# Patient Record
Sex: FEMALE | Race: WHITE | NOT HISPANIC OR LATINO | Employment: OTHER
[De-identification: names, ages, dates, MRNs, and addresses within clinical notes are randomized per-mention and may not be internally consistent; named-entity substitution may affect disease eponyms.]

---

## 2019-04-23 ENCOUNTER — HOSPITAL (OUTPATIENT)
Dept: OTHER | Age: 74
End: 2019-04-23
Attending: INTERNAL MEDICINE

## 2019-04-25 ENCOUNTER — HOSPITAL (OUTPATIENT)
Dept: OTHER | Age: 74
End: 2019-04-25
Attending: INTERNAL MEDICINE

## 2019-05-07 ENCOUNTER — HOSPITAL (OUTPATIENT)
Dept: OTHER | Age: 74
End: 2019-05-07

## 2019-05-07 ENCOUNTER — HOSPITAL (OUTPATIENT)
Dept: OTHER | Age: 74
End: 2019-05-07
Attending: INTERNAL MEDICINE

## 2019-05-20 ENCOUNTER — HOSPITAL (OUTPATIENT)
Dept: OTHER | Age: 74
End: 2019-05-20
Attending: INTERNAL MEDICINE

## 2020-05-18 DIAGNOSIS — R91.8 OTHER NONSPECIFIC ABNORMAL FINDING OF LUNG FIELD: Primary | ICD-10-CM

## 2020-05-22 ENCOUNTER — HOSPITAL ENCOUNTER (OUTPATIENT)
Dept: CT IMAGING | Age: 75
Discharge: HOME | End: 2020-05-22
Attending: INTERNAL MEDICINE

## 2020-05-22 DIAGNOSIS — R91.8 OTHER NONSPECIFIC ABNORMAL FINDING OF LUNG FIELD: ICD-10-CM

## 2020-05-22 PROCEDURE — 71250 CT THORAX DX C-: CPT

## 2020-05-29 PROBLEM — I34.1 MVP (MITRAL VALVE PROLAPSE): Status: ACTIVE | Noted: 2020-01-07

## 2020-05-29 PROBLEM — Q21.1 PFO WITH ATRIAL SEPTAL ANEURYSM: Status: ACTIVE | Noted: 2020-01-07

## 2020-05-29 PROBLEM — I25.3 PFO WITH ATRIAL SEPTAL ANEURYSM (HCC): Status: ACTIVE | Noted: 2020-01-07

## 2020-05-29 PROBLEM — Q21.12 PFO WITH ATRIAL SEPTAL ANEURYSM: Status: ACTIVE | Noted: 2020-01-07

## 2020-05-29 PROBLEM — I25.3 PFO WITH ATRIAL SEPTAL ANEURYSM: Status: ACTIVE | Noted: 2020-01-07

## 2020-05-29 PROBLEM — Q21.12 PFO WITH ATRIAL SEPTAL ANEURYSM (HCC): Status: ACTIVE | Noted: 2020-01-07

## 2020-05-29 PROBLEM — I34.0 NONRHEUMATIC MITRAL VALVE REGURGITATION: Status: ACTIVE | Noted: 2020-01-07

## 2020-05-29 PROBLEM — I49.3 SYMPTOMATIC PVCS: Status: ACTIVE | Noted: 2018-12-07

## 2020-08-06 RX ORDER — BIOTIN 10000 MCG
CAPSULE ORAL DAILY
COMMUNITY

## 2020-08-06 RX ORDER — ATORVASTATIN CALCIUM 10 MG/1
10 TABLET, FILM COATED ORAL NIGHTLY
COMMUNITY

## 2020-08-10 ENCOUNTER — LAB ENCOUNTER (OUTPATIENT)
Dept: LAB | Facility: HOSPITAL | Age: 75
End: 2020-08-10
Attending: STUDENT IN AN ORGANIZED HEALTH CARE EDUCATION/TRAINING PROGRAM
Payer: MEDICARE

## 2020-08-10 DIAGNOSIS — K59.4 RECTAL SPASM: ICD-10-CM

## 2020-08-11 LAB — SARS-COV-2 RNA RESP QL NAA+PROBE: NOT DETECTED

## 2020-08-12 ENCOUNTER — ANESTHESIA EVENT (OUTPATIENT)
Dept: ENDOSCOPY | Facility: HOSPITAL | Age: 75
End: 2020-08-12
Payer: MEDICARE

## 2020-08-12 ENCOUNTER — HOSPITAL ENCOUNTER (OUTPATIENT)
Facility: HOSPITAL | Age: 75
Setting detail: HOSPITAL OUTPATIENT SURGERY
Discharge: HOME | End: 2020-08-12
Attending: STUDENT IN AN ORGANIZED HEALTH CARE EDUCATION/TRAINING PROGRAM | Admitting: STUDENT IN AN ORGANIZED HEALTH CARE EDUCATION/TRAINING PROGRAM
Payer: MEDICARE

## 2020-08-12 ENCOUNTER — ANESTHESIA (OUTPATIENT)
Dept: ENDOSCOPY | Facility: HOSPITAL | Age: 75
End: 2020-08-12
Payer: MEDICARE

## 2020-08-12 VITALS
SYSTOLIC BLOOD PRESSURE: 136 MMHG | DIASTOLIC BLOOD PRESSURE: 63 MMHG | BODY MASS INDEX: 33.38 KG/M2 | HEART RATE: 60 BPM | TEMPERATURE: 97 F | RESPIRATION RATE: 16 BRPM | HEIGHT: 60 IN | WEIGHT: 170 LBS | OXYGEN SATURATION: 98 %

## 2020-08-12 DIAGNOSIS — K59.4 RECTAL SPASM: Primary | ICD-10-CM

## 2020-08-12 DIAGNOSIS — Z86.010 PERSONAL HISTORY OF COLONIC POLYPS: ICD-10-CM

## 2020-08-12 DIAGNOSIS — Z80.0 FAMILY HISTORY OF COLON CANCER IN MOTHER: ICD-10-CM

## 2020-08-12 PROCEDURE — 88305 TISSUE EXAM BY PATHOLOGIST: CPT | Performed by: STUDENT IN AN ORGANIZED HEALTH CARE EDUCATION/TRAINING PROGRAM

## 2020-08-12 PROCEDURE — 0DBK8ZX EXCISION OF ASCENDING COLON, VIA NATURAL OR ARTIFICIAL OPENING ENDOSCOPIC, DIAGNOSTIC: ICD-10-PCS | Performed by: STUDENT IN AN ORGANIZED HEALTH CARE EDUCATION/TRAINING PROGRAM

## 2020-08-12 RX ORDER — OMEGA-3S/DHA/EPA/FISH OIL 1000-1400
CAPSULE,DELAYED RELEASE (ENTERIC COATED) ORAL
COMMUNITY
Start: 2020-07-10

## 2020-08-12 RX ORDER — ONDANSETRON 2 MG/ML
4 INJECTION INTRAMUSCULAR; INTRAVENOUS AS NEEDED
Status: DISCONTINUED | OUTPATIENT
Start: 2020-08-12 | End: 2020-08-12

## 2020-08-12 RX ORDER — LIDOCAINE HYDROCHLORIDE 10 MG/ML
INJECTION, SOLUTION EPIDURAL; INFILTRATION; INTRACAUDAL; PERINEURAL AS NEEDED
Status: DISCONTINUED | OUTPATIENT
Start: 2020-08-12 | End: 2020-08-12 | Stop reason: SURG

## 2020-08-12 RX ORDER — SODIUM CHLORIDE, SODIUM LACTATE, POTASSIUM CHLORIDE, CALCIUM CHLORIDE 600; 310; 30; 20 MG/100ML; MG/100ML; MG/100ML; MG/100ML
INJECTION, SOLUTION INTRAVENOUS CONTINUOUS
Status: DISCONTINUED | OUTPATIENT
Start: 2020-08-12 | End: 2020-08-12

## 2020-08-12 RX ORDER — NALOXONE HYDROCHLORIDE 0.4 MG/ML
80 INJECTION, SOLUTION INTRAMUSCULAR; INTRAVENOUS; SUBCUTANEOUS AS NEEDED
Status: DISCONTINUED | OUTPATIENT
Start: 2020-08-12 | End: 2020-08-12

## 2020-08-12 RX ADMIN — SODIUM CHLORIDE, SODIUM LACTATE, POTASSIUM CHLORIDE, CALCIUM CHLORIDE: 600; 310; 30; 20 INJECTION, SOLUTION INTRAVENOUS at 11:48:00

## 2020-08-12 RX ADMIN — LIDOCAINE HYDROCHLORIDE 30 MG: 10 INJECTION, SOLUTION EPIDURAL; INFILTRATION; INTRACAUDAL; PERINEURAL at 11:27:00

## 2020-08-12 RX ADMIN — SODIUM CHLORIDE, SODIUM LACTATE, POTASSIUM CHLORIDE, CALCIUM CHLORIDE: 600; 310; 30; 20 INJECTION, SOLUTION INTRAVENOUS at 11:22:00

## 2020-08-12 NOTE — ANESTHESIA PREPROCEDURE EVALUATION
PRE-OP EVALUATION    Patient Name: Giovanni Gitelman    Pre-op Diagnosis: Rectal spasm [K59.4]  Personal history of colonic polyps [Z86.010]  Family history of colon cancer in mother [Z80.0]    Procedure(s):  COLONOSCOPY    Surgeon(s) and Role:     * Moises, colon     Encounter for routine gynecological examination     MVP (mitral valve prolapse)     Nonrheumatic mitral valve regurgitation     Osteopenia     Other and unspecified hyperlipidemia     Palpitations     PFO with atrial septal aneurysm     Stenosis

## 2020-08-12 NOTE — H&P
Gastroenterology H/P  I have personally seen and examined the patient. Patient Name: Wilma Whitman  CC: history of colon polyps  HPI: Seen by NP in our office 5/2020. Has history of colon adenoma in 2015, due for follow-up.   She has chronic intermitt skin disorders            Rheumatologic: The patient reports no history of chronic arthritis, myalgias, arthralgias            Genitourinary:  The patient reports no history of recurrent urinary tract infections, hematuria, dysuria, or nephrolithiasis was obtained from the patient for the above procedure. She was told indications, nature and outcome, alternatives, risks, and complications. She fully understood and agreed to the procedure.       Carroll De Leon MD

## 2020-08-12 NOTE — OPERATIVE REPORT
Colon operative report  Patient Name: El Gardner  Procedure: Colonoscopy with forceps polypectomy  Indication: history of adenomatous colon polyps  Attending: Anny Aviles M.D.   Consent: The risks, benefits, and alternatives were discussed with the colon.  4. Moderate internal hemorrhoids. 5. Otherwise normal exam.  Impression: Findings as above. Recommendations:   1. Await pathology. 2. High fiber diet.   3. Could consider a repeat in 5 years if has symptoms or other concerns, but given the mini

## 2020-08-12 NOTE — ANESTHESIA POSTPROCEDURE EVALUATION
[facility] Patient Status:  Hospital Outpatient Surgery   Age/Gender 76year old female MRN [id number]   Location [address] [facility]. Attending Mettu, Becky Thorpe MD   Hosp Day # 0 PCP Baby Than       Anesthesia Post-op Note

## 2020-08-15 NOTE — PROGRESS NOTES
Addended by: ZOHREH CONRAD on: 3/7/2019 01:14 PM     Modules accepted: Orders     Biopsies are negative for any chronic inflammation. There \"polyp-like\" tissue removed is not a true Polyp. There is no immediate reason for a repeat colonoscopy, and in fact, even less reason to put you through another exam at age [de-identified].   For now, call my

## 2022-01-17 PROBLEM — M25.511 ACUTE PAIN OF RIGHT SHOULDER: Status: ACTIVE | Noted: 2022-01-17

## 2022-01-17 PROBLEM — M75.41 IMPINGEMENT SYNDROME OF RIGHT SHOULDER: Status: ACTIVE | Noted: 2022-01-17

## 2022-01-31 ENCOUNTER — LAB ENCOUNTER (OUTPATIENT)
Dept: LAB | Age: 77
End: 2022-01-31
Attending: STUDENT IN AN ORGANIZED HEALTH CARE EDUCATION/TRAINING PROGRAM
Payer: MEDICARE

## 2022-01-31 DIAGNOSIS — R12 CHRONIC HEARTBURN: ICD-10-CM

## 2022-02-01 LAB — SARS-COV-2 RNA RESP QL NAA+PROBE: NOT DETECTED

## 2022-02-03 ENCOUNTER — HOSPITAL ENCOUNTER (OUTPATIENT)
Facility: HOSPITAL | Age: 77
Setting detail: HOSPITAL OUTPATIENT SURGERY
Discharge: HOME | End: 2022-02-03
Attending: STUDENT IN AN ORGANIZED HEALTH CARE EDUCATION/TRAINING PROGRAM | Admitting: STUDENT IN AN ORGANIZED HEALTH CARE EDUCATION/TRAINING PROGRAM
Payer: MEDICARE

## 2022-02-03 ENCOUNTER — ANESTHESIA (OUTPATIENT)
Dept: ENDOSCOPY | Facility: HOSPITAL | Age: 77
End: 2022-02-03
Payer: MEDICARE

## 2022-02-03 ENCOUNTER — ANESTHESIA EVENT (OUTPATIENT)
Dept: ENDOSCOPY | Facility: HOSPITAL | Age: 77
End: 2022-02-03
Payer: MEDICARE

## 2022-02-03 VITALS
TEMPERATURE: 97 F | RESPIRATION RATE: 16 BRPM | BODY MASS INDEX: 34.95 KG/M2 | SYSTOLIC BLOOD PRESSURE: 132 MMHG | DIASTOLIC BLOOD PRESSURE: 56 MMHG | HEART RATE: 63 BPM | WEIGHT: 178 LBS | HEIGHT: 60 IN | OXYGEN SATURATION: 96 %

## 2022-02-03 DIAGNOSIS — R12 CHRONIC HEARTBURN: Primary | ICD-10-CM

## 2022-02-03 PROCEDURE — 0DB48ZX EXCISION OF ESOPHAGOGASTRIC JUNCTION, VIA NATURAL OR ARTIFICIAL OPENING ENDOSCOPIC, DIAGNOSTIC: ICD-10-PCS | Performed by: STUDENT IN AN ORGANIZED HEALTH CARE EDUCATION/TRAINING PROGRAM

## 2022-02-03 PROCEDURE — 0DB68ZX EXCISION OF STOMACH, VIA NATURAL OR ARTIFICIAL OPENING ENDOSCOPIC, DIAGNOSTIC: ICD-10-PCS | Performed by: STUDENT IN AN ORGANIZED HEALTH CARE EDUCATION/TRAINING PROGRAM

## 2022-02-03 PROCEDURE — 88305 TISSUE EXAM BY PATHOLOGIST: CPT | Performed by: STUDENT IN AN ORGANIZED HEALTH CARE EDUCATION/TRAINING PROGRAM

## 2022-02-03 RX ORDER — SODIUM CHLORIDE, SODIUM LACTATE, POTASSIUM CHLORIDE, CALCIUM CHLORIDE 600; 310; 30; 20 MG/100ML; MG/100ML; MG/100ML; MG/100ML
INJECTION, SOLUTION INTRAVENOUS CONTINUOUS
OUTPATIENT
Start: 2022-02-03

## 2022-02-03 RX ORDER — LIDOCAINE HYDROCHLORIDE 10 MG/ML
INJECTION, SOLUTION EPIDURAL; INFILTRATION; INTRACAUDAL; PERINEURAL AS NEEDED
Status: DISCONTINUED | OUTPATIENT
Start: 2022-02-03 | End: 2022-02-03 | Stop reason: SURG

## 2022-02-03 RX ORDER — NALOXONE HYDROCHLORIDE 0.4 MG/ML
80 INJECTION, SOLUTION INTRAMUSCULAR; INTRAVENOUS; SUBCUTANEOUS AS NEEDED
OUTPATIENT
Start: 2022-02-03 | End: 2022-02-03

## 2022-02-03 RX ORDER — SODIUM CHLORIDE, SODIUM LACTATE, POTASSIUM CHLORIDE, CALCIUM CHLORIDE 600; 310; 30; 20 MG/100ML; MG/100ML; MG/100ML; MG/100ML
INJECTION, SOLUTION INTRAVENOUS CONTINUOUS
Status: DISCONTINUED | OUTPATIENT
Start: 2022-02-03 | End: 2022-02-03

## 2022-02-03 RX ADMIN — LIDOCAINE HYDROCHLORIDE 100 MG: 10 INJECTION, SOLUTION EPIDURAL; INFILTRATION; INTRACAUDAL; PERINEURAL at 07:51:00

## 2022-02-03 NOTE — ANESTHESIA POSTPROCEDURE EVALUATION
[address] Patient Status:  Hospital Outpatient Surgery   Age/Gender 68year old female MRN [id number]   Location [address] [address] Attending tu, Frances Horner MD   Hosp Day # 0 PCP Baby Than       Anesthesia Post-op Note  ESOPHAGOGASTRODUODENOSCOPY (EGD) with biopsies    Procedure Summary     Date: 02/03/22 Room / Location: [address] [place] [address] ENDOSCOPY 02 / [address] [facility] ENDOSCOPY    Anesthesia Start: 5199 Anesthesia Stop: 9408    Procedure: ESOPHAGOGASTRODUODENOSCOPY (EGD) with biopsies (N/A ) Diagnosis:       Chronic heartburn      (gastritis, gastric polyps, reflux esophagitis)    Surgeons: Franca Madrigal MD Anesthesiologist: Ziggy Mack MD    Anesthesia Type: MAC ASA Status: 3          Anesthesia Type: MAC    Vitals Value Taken Time   /56 02/03/22 0821   Temp  02/03/22 0846   Pulse 66 02/03/22 0825   Resp 16 02/03/22 0820   SpO2 95 % 02/03/22 0825   Vitals shown include unvalidated device data. Patient Location: Endoscopy    Anesthesia Type: MAC    Airway Patency: patent    Postop Pain Control: adequate    Mental Status: mildly sedated but able to meaningfully participate in the post-anesthesia evaluation    Nausea/Vomiting: none    Cardiopulmonary/Hydration status: stable euvolemic    Complications: no apparent anesthesia related complications    Postop vital signs: stable    Dental Exam: Unchanged from Preop    Patient to be discharged home when criteria met.

## 2022-02-11 NOTE — PROGRESS NOTES
Debra Horn,    Biopsies show chronic reflux, as expected. No other notable findings. 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   Please call with any questions,    Mariah Pelletier MD

## 2022-12-14 ENCOUNTER — OFFICE VISIT (OUTPATIENT)
Facility: LOCATION | Age: 77
End: 2022-12-14
Payer: MEDICARE

## 2022-12-14 DIAGNOSIS — J38.6 SUBGLOTTIC STENOSIS: Primary | ICD-10-CM

## 2022-12-14 DIAGNOSIS — H61.23 BILATERAL IMPACTED CERUMEN: ICD-10-CM

## 2022-12-14 PROCEDURE — 92504 EAR MICROSCOPY EXAMINATION: CPT | Performed by: OTOLARYNGOLOGY

## 2022-12-14 PROCEDURE — 99214 OFFICE O/P EST MOD 30 MIN: CPT | Performed by: OTOLARYNGOLOGY

## 2022-12-14 PROCEDURE — 31575 DIAGNOSTIC LARYNGOSCOPY: CPT | Performed by: OTOLARYNGOLOGY

## 2022-12-14 RX ORDER — CEFUROXIME AXETIL 250 MG/1
250 TABLET ORAL 2 TIMES DAILY
Qty: 20 TABLET | Refills: 0 | Status: SHIPPED | OUTPATIENT
Start: 2022-12-14

## 2022-12-14 RX ORDER — METHYLPREDNISOLONE 4 MG/1
TABLET ORAL
Qty: 21 TABLET | Refills: 0 | Status: SHIPPED | OUTPATIENT
Start: 2022-12-14

## 2023-04-23 ENCOUNTER — WALK IN (OUTPATIENT)
Dept: URGENT CARE | Age: 78
End: 2023-04-23
Attending: EMERGENCY MEDICINE

## 2023-04-23 VITALS
HEART RATE: 74 BPM | TEMPERATURE: 98 F | BODY MASS INDEX: 28.16 KG/M2 | OXYGEN SATURATION: 96 % | DIASTOLIC BLOOD PRESSURE: 83 MMHG | SYSTOLIC BLOOD PRESSURE: 132 MMHG | RESPIRATION RATE: 16 BRPM | WEIGHT: 153 LBS | HEIGHT: 62 IN

## 2023-04-23 DIAGNOSIS — H10.33 ACUTE BACTERIAL CONJUNCTIVITIS OF BOTH EYES: Primary | ICD-10-CM

## 2023-04-23 PROCEDURE — 99202 OFFICE O/P NEW SF 15 MIN: CPT

## 2023-04-23 RX ORDER — POLYMYXIN B SULFATE AND TRIMETHOPRIM 1; 10000 MG/ML; [USP'U]/ML
SOLUTION OPHTHALMIC
Qty: 10 ML | Refills: 0 | Status: SHIPPED | OUTPATIENT
Start: 2023-04-23

## 2023-04-23 ASSESSMENT — ENCOUNTER SYMPTOMS
EYE REDNESS: 1
EYE DISCHARGE: 1
PSYCHIATRIC NEGATIVE: 1
FEVER: 0
RESPIRATORY NEGATIVE: 1
NEUROLOGICAL NEGATIVE: 1
EYE ITCHING: 1
FOREIGN BODY SENSATION: 0
GASTROINTESTINAL NEGATIVE: 1
ENDOCRINE NEGATIVE: 1
CONSTITUTIONAL NEGATIVE: 1
ALLERGIC/IMMUNOLOGIC NEGATIVE: 1
HEMATOLOGIC/LYMPHATIC NEGATIVE: 1

## 2023-04-23 ASSESSMENT — PAIN SCALES - GENERAL
PAINLEVEL: 0
PAINLEVEL: 0

## 2023-04-23 ASSESSMENT — VISUAL ACUITY
OD_CC: 20/20
OS_CC: 20/20

## 2023-12-04 PROBLEM — I36.1 NON-RHEUMATIC TRICUSPID VALVE INSUFFICIENCY: Status: ACTIVE | Noted: 2020-01-07

## 2023-12-04 PROBLEM — I25.3 ATRIAL SEPTAL ANEURYSM: Status: ACTIVE | Noted: 2020-07-07

## 2023-12-08 ENCOUNTER — LAB ENCOUNTER (OUTPATIENT)
Dept: LAB | Age: 78
End: 2023-12-08
Payer: MEDICARE

## 2023-12-08 DIAGNOSIS — R74.01 ELEVATED ALT MEASUREMENT: ICD-10-CM

## 2023-12-08 DIAGNOSIS — R19.5 LOOSE STOOLS: ICD-10-CM

## 2023-12-08 LAB
CRYPTOSP AG STL QL IA: NEGATIVE
G LAMBLIA AG STL QL IA: NEGATIVE

## 2023-12-08 PROCEDURE — 82705 FATS/LIPIDS FECES QUAL: CPT

## 2023-12-08 PROCEDURE — 87272 CRYPTOSPORIDIUM AG IF: CPT

## 2023-12-08 PROCEDURE — 87329 GIARDIA AG IA: CPT

## 2023-12-08 PROCEDURE — 82656 EL-1 FECAL QUAL/SEMIQ: CPT

## 2023-12-08 PROCEDURE — 87493 C DIFF AMPLIFIED PROBE: CPT

## 2023-12-08 PROCEDURE — 89055 LEUKOCYTE ASSESSMENT FECAL: CPT

## 2023-12-08 PROCEDURE — 83993 ASSAY FOR CALPROTECTIN FECAL: CPT

## 2023-12-09 LAB — C DIFF TOX B STL QL: NEGATIVE

## 2023-12-11 LAB — CALPROTECTIN STL-MCNT: 34.3 ΜG/G (ref ?–50)

## 2023-12-13 LAB — PANCREATIC ELAST FECAL: 370 UG ELAST./G

## 2023-12-14 LAB
FATS NEUTRAL: NORMAL
FATS TOTAL: NORMAL

## 2024-01-10 ENCOUNTER — HOSPITAL ENCOUNTER (OUTPATIENT)
Dept: ULTRASOUND IMAGING | Age: 79
Discharge: HOME | End: 2024-01-10
Payer: MEDICARE

## 2024-01-10 DIAGNOSIS — R74.01 ELEVATED ALT MEASUREMENT: ICD-10-CM

## 2024-01-10 PROCEDURE — 76700 US EXAM ABDOM COMPLETE: CPT

## 2024-01-10 NOTE — PROGRESS NOTES
Results reviewed and discussed with the patient over the phone. US of the abdomen showed Echogenic mass in the liver. Recommend MRI liver with Eovist contrast. Pt verbalizes understanding and agrees with the plan.

## 2024-01-14 ENCOUNTER — HOSPITAL ENCOUNTER (OUTPATIENT)
Dept: MRI IMAGING | Facility: HOSPITAL | Age: 79
Discharge: HOME | End: 2024-01-14
Payer: MEDICARE

## 2024-01-14 ENCOUNTER — HOSPITAL ENCOUNTER (OUTPATIENT)
Dept: MRI IMAGING | Facility: HOSPITAL | Age: 79
End: 2024-01-14
Payer: MEDICARE

## 2024-01-14 DIAGNOSIS — K76.9 LIVER LESION: ICD-10-CM

## 2024-01-14 PROCEDURE — A9575 INJ GADOTERATE MEGLUMI 0.1ML: HCPCS

## 2024-01-14 PROCEDURE — 74183 MRI ABD W/O CNTR FLWD CNTR: CPT

## 2024-01-14 RX ORDER — GADOTERATE MEGLUMINE 376.9 MG/ML
20 INJECTION INTRAVENOUS
Status: COMPLETED | OUTPATIENT
Start: 2024-01-14 | End: 2024-01-14

## 2024-01-14 RX ADMIN — GADOTERATE MEGLUMINE 16 ML: 376.9 INJECTION INTRAVENOUS at 10:23:00

## 2024-01-16 NOTE — PROGRESS NOTES
Results reviewed and discussed with the patient over the phone. MRI liver showed fatty liver disease. Recommend gradual weight loss and proceed with AFP tumor marker lab. She verbalizes understanding and agrees with the plan.

## 2024-01-25 ENCOUNTER — LAB ENCOUNTER (OUTPATIENT)
Dept: LAB | Age: 79
End: 2024-01-25
Payer: MEDICARE

## 2024-01-25 DIAGNOSIS — K76.9 LIVER LESION: ICD-10-CM

## 2024-01-25 DIAGNOSIS — K76.0 NAFLD (NONALCOHOLIC FATTY LIVER DISEASE): ICD-10-CM

## 2024-01-25 DIAGNOSIS — K74.01 HEPATIC FIBROSIS, EARLY FIBROSIS: ICD-10-CM

## 2024-01-25 LAB — AFP-TM SERPL-MCNC: 8.6 NG/ML

## 2024-01-25 PROCEDURE — 36415 COLL VENOUS BLD VENIPUNCTURE: CPT

## 2024-01-25 PROCEDURE — 82105 ALPHA-FETOPROTEIN SERUM: CPT

## 2024-02-28 ENCOUNTER — LAB ENCOUNTER (OUTPATIENT)
Dept: LAB | Age: 79
End: 2024-02-28
Payer: MEDICARE

## 2024-02-28 DIAGNOSIS — R97.8 OTHER ABNORMAL TUMOR MARKERS: ICD-10-CM

## 2024-02-28 DIAGNOSIS — R74.01 ELEVATED ALT MEASUREMENT: ICD-10-CM

## 2024-02-28 DIAGNOSIS — K76.0 FATTY LIVER: ICD-10-CM

## 2024-02-28 LAB
AFP-TM SERPL-MCNC: 11.1 NG/ML
ALBUMIN SERPL-MCNC: 4.4 G/DL (ref 3.2–4.8)
ALBUMIN/GLOB SERPL: 1.4 {RATIO} (ref 1–2)
ALP LIVER SERPL-CCNC: 60 U/L
ALT SERPL-CCNC: 23 U/L
ANION GAP SERPL CALC-SCNC: 5 MMOL/L (ref 0–18)
AST SERPL-CCNC: 30 U/L (ref ?–34)
BASOPHILS # BLD AUTO: 0.01 X10(3) UL (ref 0–0.2)
BASOPHILS NFR BLD AUTO: 0.4 %
BILIRUB SERPL-MCNC: 0.6 MG/DL (ref 0.2–1.1)
BUN BLD-MCNC: 17 MG/DL (ref 9–23)
BUN/CREAT SERPL: 21.5 (ref 10–20)
CALCIUM BLD-MCNC: 9.9 MG/DL (ref 8.7–10.4)
CHLORIDE SERPL-SCNC: 107 MMOL/L (ref 98–112)
CO2 SERPL-SCNC: 27 MMOL/L (ref 21–32)
CREAT BLD-MCNC: 0.79 MG/DL
DEPRECATED RDW RBC AUTO: 51.3 FL (ref 35.1–46.3)
EGFRCR SERPLBLD CKD-EPI 2021: 77 ML/MIN/1.73M2 (ref 60–?)
EOSINOPHIL # BLD AUTO: 0.01 X10(3) UL (ref 0–0.7)
EOSINOPHIL NFR BLD AUTO: 0.4 %
ERYTHROCYTE [DISTWIDTH] IN BLOOD BY AUTOMATED COUNT: 14.2 % (ref 11–15)
FASTING STATUS PATIENT QL REPORTED: YES
GLOBULIN PLAS-MCNC: 3.1 G/DL (ref 2.8–4.4)
GLUCOSE BLD-MCNC: 97 MG/DL (ref 70–99)
HCT VFR BLD AUTO: 39.6 %
HGB BLD-MCNC: 13.2 G/DL
IMM GRANULOCYTES # BLD AUTO: 0.01 X10(3) UL (ref 0–1)
IMM GRANULOCYTES NFR BLD: 0.4 %
LYMPHOCYTES # BLD AUTO: 1.14 X10(3) UL (ref 1–4)
LYMPHOCYTES NFR BLD AUTO: 44.4 %
MCH RBC QN AUTO: 32.5 PG (ref 26–34)
MCHC RBC AUTO-ENTMCNC: 33.3 G/DL (ref 31–37)
MCV RBC AUTO: 97.5 FL
MONOCYTES # BLD AUTO: 0.36 X10(3) UL (ref 0.1–1)
MONOCYTES NFR BLD AUTO: 14 %
NEUTROPHILS # BLD AUTO: 1.04 X10 (3) UL (ref 1.5–7.7)
NEUTROPHILS # BLD AUTO: 1.04 X10(3) UL (ref 1.5–7.7)
NEUTROPHILS NFR BLD AUTO: 40.4 %
OSMOLALITY SERPL CALC.SUM OF ELEC: 289 MOSM/KG (ref 275–295)
PLATELET # BLD AUTO: 134 10(3)UL (ref 150–450)
POTASSIUM SERPL-SCNC: 4.6 MMOL/L (ref 3.5–5.1)
PROT SERPL-MCNC: 7.5 G/DL (ref 5.7–8.2)
RBC # BLD AUTO: 4.06 X10(6)UL
SODIUM SERPL-SCNC: 139 MMOL/L (ref 136–145)
WBC # BLD AUTO: 2.6 X10(3) UL (ref 4–11)

## 2024-02-28 PROCEDURE — 85025 COMPLETE CBC W/AUTO DIFF WBC: CPT

## 2024-02-28 PROCEDURE — 80053 COMPREHEN METABOLIC PANEL: CPT

## 2024-02-28 PROCEDURE — 36415 COLL VENOUS BLD VENIPUNCTURE: CPT

## 2024-02-28 PROCEDURE — 82105 ALPHA-FETOPROTEIN SERUM: CPT

## 2024-04-09 RX ORDER — MULTIVITAMIN WITH IRON
50 TABLET ORAL DAILY
COMMUNITY

## 2024-04-09 RX ORDER — METRONIDAZOLE 500 MG/1
500 TABLET ORAL 3 TIMES DAILY
COMMUNITY

## 2024-04-09 RX ORDER — ASPIRIN 81 MG/1
81 TABLET ORAL DAILY
COMMUNITY

## 2024-04-09 RX ORDER — CIPROFLOXACIN 500 MG/1
500 TABLET, FILM COATED ORAL 2 TIMES DAILY
COMMUNITY
End: 2024-05-17 | Stop reason: ALTCHOICE

## 2024-04-16 ENCOUNTER — HOSPITAL ENCOUNTER (OUTPATIENT)
Dept: MRI IMAGING | Age: 79
Discharge: HOME | End: 2024-04-16
Payer: MEDICARE

## 2024-04-16 DIAGNOSIS — R77.2 ELEVATED AFP: ICD-10-CM

## 2024-04-16 PROCEDURE — A9575 INJ GADOTERATE MEGLUMI 0.1ML: HCPCS

## 2024-04-16 PROCEDURE — 74183 MRI ABD W/O CNTR FLWD CNTR: CPT

## 2024-04-16 RX ORDER — GADOTERATE MEGLUMINE 376.9 MG/ML
20 INJECTION INTRAVENOUS
Status: COMPLETED | OUTPATIENT
Start: 2024-04-16 | End: 2024-04-16

## 2024-04-16 RX ADMIN — GADOTERATE MEGLUMINE 20 ML: 376.9 INJECTION INTRAVENOUS at 15:58:00

## 2024-04-26 ENCOUNTER — HOSPITAL ENCOUNTER (OUTPATIENT)
Dept: ULTRASOUND IMAGING | Age: 79
Discharge: HOME | End: 2024-04-26
Payer: MEDICARE

## 2024-04-26 DIAGNOSIS — R74.01 ELEVATED ALT MEASUREMENT: ICD-10-CM

## 2024-04-26 DIAGNOSIS — K76.0 FATTY LIVER: ICD-10-CM

## 2024-04-26 PROCEDURE — 76705 ECHO EXAM OF ABDOMEN: CPT

## 2024-04-26 PROCEDURE — 76981 USE PARENCHYMA: CPT

## 2024-05-17 NOTE — H&P (VIEW-ONLY)
CHIEF COMPLAINT:   Liver Disease    HPI:    Afua Bryant is a 79 year old female with history of esophagitis, diverticulosis with diverticulitis, fatty liver with combination of NARAYANAN and CINDY, liver hemangioma.  She is here for follow-up, last seen 2/2024 by Edmar Sol NP.      Had bout of diverticulitis (suspected) and took 5 days of Cipro and Flagyl.  This was in early April 2024.  She had EGD and colonoscopy scheduled for 4/18/2024.  This has since been rescheduled to 6/13/2024.    She had been given antibiotics by her PCP prior to going on a river cruise, towards the end of her trip developed her typical diverticulitis symptoms (lower pelvic pain, L sided abd pain, low grade temps), so she started the antibiotics.    She had no rectal bleeding, did have her typical chronic diarrhea.    She remains on her psyllium and since then, the early morning stool is normal, but the latter part of the day has mushier stools that are at times uncontrollable.    She has been on Lexapro for the last 2 yrs, considering going off of this, as she doesn't feel that she needs it anymore.  She has to use her Nexium twice daily, as she gets recurrent reflux symptoms in the evening if she only takes its once daily in the morning.    She has minimized EtOH prev, but now drinking regularly again, on avg 10 large glasses of wine per week       Gastrointestinal History   Family History: fam hx of colon CA (mother 60s)     8/04/1997 - Colonoscopy Dr. Gutierrez  - diverticulosis, 5yr recall     8/08/2001 - SGI Consult Dr. Rene  + GERD  - constant hoarseness of voice  - pt feels that Prilosec is giving her side effects of rectal itching     8/17/2001 - Barium swallow/esophagus: small HH, mild reflux, decreased secondary peristalsis     9/26/2002 - EGD/Colonoscopy Dr. Rene  EGD: antral gastritis, small HH, Bx: negative for H. Pylori  Colonoscopy: colon polyp x1 <10mm; HP, diverticulosis, internal hemorrhoids, 5yr recall      10/05/2005 - EGD/Colonoscopy Dr. Rene  EGD: gastritis, small HH, Bx: negative for H. Pylori  Colonoscopy: colon polyps x3 <10mm; HP x3, diverticulosis, internal hemorrhoids     11/24/2008 - Colonoscopy Dr. Rene  - colon polyp x1 <10mm; HP, diverticulosis, internal hemorrhoids, 5-7yr recall     10/05/2010 - EGD Dr. Rene  - gastric polyps, Bx: fundic gland polyps, negative for H. Pylori  REC: Nexium 40mg BID     11/15/2011 - Colonoscopy Dr. Rene  - colon polyps x2 <10mm; TA x1; HP x1, diverticulosis, 5yr recall     6/21/2016 - OV [facility] APN  + heartburn  - when she has epiglottic sx she gasps for breath and has difficulty breathing  - taking Nexium 40mg in the morning and 20mg in the evening  - sleep w/ elevated HOB  - intermittent episodes of rectal pain that wake her up at night  REC: decrease Nexium to 20mg BID, EGD     8/16/2016 - EGD Dr. Rene  - gastric polyps, Bx: fundic gland polyps, negative for H. Pylori  REC: Nexium 40mg in the morning and 20mg in the evening     5/29/2020 - Virtual Phone Yolanda APRN  + personal hx of colon polyps, fam hx of colon CA in mother, rectal spasm  - occasional loose stools  - denies heartburn on Nexium 20mg BID, tried taking it once daily and had recurrence of sx  REC: colonoscopy, fiber supplement daily, continue Nexium 20mg BID, trial of Dicyclomine 10mg BID PRN     8/12/2020 - Colonoscopy Dr. De Leon  - 1-2 mm polypoid mucosa, diverticulosis, internal hemorrhoids, random colon Bx: negative, no recall needed     11/19/2021 - OV Dr. De Leon  + chronic heartburn  REC: continue Omeprazole 20mg BID, EGD     2/03/2022 - EGD Dr. De Leon  - mild reflux esophagitis, negative for H. Pylori  REC: continue Omeprazole 20mg BID     12/04/2023 - OV Edmar WILHELM  + loose stools, elevated ALT measurement, change in bowel habits, diverticulitis, fam hx of colon CA  - increased alcohol to three glass/night, skips alcohol a couple nights/week  - had a flare of diverticulitis in 7/2023 that  required abx  - daily nausea  - taking Nexium 20mg BID  REC: colonoscopy, US abd, Psyllium Husk 2 capsules BID, continue Nexium 20mg BID     1/10/2024 US abd:   1. Echogenic mass in the liver, likely benign and related to a hemangioma.  Full characterization with liver mass protocol CT or MRI with contrast could offer additional characterization.   2. Bowel gas obscuration of the pancreatic tail      1/14/2024 - MRI abd liver: There are focal areas of fatty infiltration involving the liver.  No MR evidence of focal hepatic mass.         HISTORY:    Past Medical History:    Anxiety state    Arrhythmia    pvcs    Arthritis    Asthma (HCC)    Back pain    Bad breath    Bloating    Blood disorder    myelodysplastic anemia    Cancer (HCC)    skin cancer: face    Cataract    Depression    Diarrhea, unspecified    Difficult intubation    hx of subglottic stricture    Disorder of liver    fatty liver    Diverticulosis of large intestine    Esophageal reflux    Eye disease    Cataracts    Fatigue    Flatulence/gas pain/belching    Heart palpitations    Heart valve disease    Heartburn    Hemorrhoids    High blood pressure    High cholesterol    History of cardiac murmur    History of depression    Hoarseness, chronic    Indigestion    Irregular bowel habits    Nausea    Night sweats    Pain in joints    PONV (postoperative nausea and vomiting)    Problems with swallowing    subglottic stricture    Shortness of breath    Shoulder pain    right, PT    Sleep disturbance    Stool incontinence    Uncomfortable fullness after meals    Visual impairment    glasses    Wears glasses    Weight gain    Wheezing      Past Surgical History:   Procedure Laterality Date    Appendectomy  1967    Arthroscopy of joint unlisted      right knee    Colonoscopy N/A 08/12/2020    Procedure: COLONOSCOPY;  Surgeon: Bernardino De Leon MD;  Location:  ENDOSCOPY    Dilation/curettage,diagnostic  1976    Ercp,diagnostic      Tonsillectomy      Upper  gi endoscopy,exam      with dilation x 5 times          Current Outpatient Medications on File Prior to Visit   Medication Sig Dispense Refill    vitamin B-12 50 MCG Oral Tab Take 1 tablet (50 mcg total) by mouth daily.      aspirin 81 MG Oral Tab EC Take 1 tablet (81 mg total) by mouth daily.      metRONIDAZOLE 500 MG Oral Tab Take 1 tablet (500 mg total) by mouth 3 (three) times daily.      moxifloxacin 0.5 % Ophthalmic Solution Apply 1 drop to eye in the morning, at noon, in the evening, and at bedtime.      prednisoLONE 1 % Ophthalmic Suspension Apply 1 drop to eye 4 (four) times daily.      PEG 3350-KCl-Na Bicarb-NaCl 420 g Oral Recon Soln Take as directed by your doctor 4000 mL 0    escitalopram 5 MG Oral Tab Take 1 tablet (5 mg total) by mouth daily.      atorvastatin 10 MG Oral Tab Take 1 tablet (10 mg total) by mouth nightly.      Metoprolol Succinate ER 25 MG Oral Tablet 24 Hr Take 1 tablet (25 mg total) by mouth daily. 1.5 tabs daily      Esomeprazole Magnesium 20 MG Oral Capsule Delayed Release Take 1 capsule (20 mg total) by mouth 2 (two) times daily before meals.      Multiple Vitamins-Minerals (MULTI-VITAMIN GUMMIES) Oral Chew Tab Chew by mouth daily.         No current facility-administered medications on file prior to visit.       Current Allergies:   Allergies   Allergen Reactions    Fruit & Vegetable ITCHING, Coughing and SWELLING     ORAL ALLERGY SYNDROME WITH SOME FRUITS AND VEGETABLES    Gramineae Pollens RASH    Apple OTHER (SEE COMMENTS)    Celery Oil OTHER (SEE COMMENTS)    Nutritional Supplements OTHER (SEE COMMENTS)    Other OTHER (SEE COMMENTS)     Cats:  Sneezing, swelling of eyes, tearing of eyes    Seasonal ITCHING       Family History   Problem Relation Age of Onset    Alcohol and Other Disorders Associated Father     Hypertension Father     Colon Cancer Mother     Ulcerative Colitis Mother     Mental Disorder Mother     Prostate Cancer Paternal Grandfather     Colon Polyps Sister      Colon Cancer Sister           of septicemia probably due to undiagnosed colon tumor       Social Hx  10 larger glasses of wine per week  No tobacco  No illicit drugs      ROS:   A comprehensive 14 point review of systems was completed.  Pertinent positives and negatives noted in the the HPI.     Vital signs:  /69 (BP Location: Left arm, Patient Position: Sitting, Cuff Size: adult)   Pulse 59   Temp 98.6 °F (37 °C) (Tympanic)   Ht 5' (1.524 m)   Wt 177 lb 3.2 oz (80.4 kg)   BMI 34.61 kg/m²        PHYSICAL EXAM:      General: Appears alert, oriented x 3 and in no acute distress.  ABDOMEN: Non-tender.  Soft and non-distended. No masses. Bowel sounds are present.  There is no gross ascites or fluid wave.        IMPRESSION:      Encounter Diagnoses   Name Primary?    Diverticulitis     Gastroesophageal reflux disease without esophagitis Yes    Fatty liver     Elevated ALT measurement        PLAN:   Daily over-the-counter probiotics (Florastor, Culturelle, [employer], Align, or generic equivalent)  Continue daily fiber (Psyllium husk, Benefiber, Metamucil)   Continue Nexium twice daily, 30 min prior to breakfast and again prior to dinner  To reduce fatty liver, you need to cut out all alcohol and focus on healthy diet and exercise  Keep appt for EGD and colonoscopy 2024    Orders This Visit:  No orders of the defined types were placed in this encounter.      Meds This Visit:  Requested Prescriptions      No prescriptions requested or ordered in this encounter       Imaging & Referrals:  EGD - INTERNAL

## 2024-06-13 ENCOUNTER — HOSPITAL ENCOUNTER (OUTPATIENT)
Facility: HOSPITAL | Age: 79
Setting detail: HOSPITAL OUTPATIENT SURGERY
Discharge: HOME | End: 2024-06-13
Attending: STUDENT IN AN ORGANIZED HEALTH CARE EDUCATION/TRAINING PROGRAM | Admitting: STUDENT IN AN ORGANIZED HEALTH CARE EDUCATION/TRAINING PROGRAM

## 2024-06-13 ENCOUNTER — ANESTHESIA (OUTPATIENT)
Dept: ENDOSCOPY | Facility: HOSPITAL | Age: 79
End: 2024-06-13
Payer: MEDICARE

## 2024-06-13 ENCOUNTER — ANESTHESIA EVENT (OUTPATIENT)
Dept: ENDOSCOPY | Facility: HOSPITAL | Age: 79
End: 2024-06-13
Payer: MEDICARE

## 2024-06-13 VITALS
TEMPERATURE: 98 F | OXYGEN SATURATION: 98 % | BODY MASS INDEX: 34.95 KG/M2 | HEIGHT: 60 IN | RESPIRATION RATE: 20 BRPM | DIASTOLIC BLOOD PRESSURE: 62 MMHG | WEIGHT: 178 LBS | HEART RATE: 62 BPM | SYSTOLIC BLOOD PRESSURE: 149 MMHG

## 2024-06-13 DIAGNOSIS — R19.4 CHANGE IN BOWEL HABITS: ICD-10-CM

## 2024-06-13 DIAGNOSIS — R19.5 LOOSE STOOLS: ICD-10-CM

## 2024-06-13 DIAGNOSIS — Z80.0 FAMILY HISTORY OF COLON CANCER: ICD-10-CM

## 2024-06-13 DIAGNOSIS — K57.92 DIVERTICULITIS: ICD-10-CM

## 2024-06-13 PROCEDURE — 88305 TISSUE EXAM BY PATHOLOGIST: CPT | Performed by: STUDENT IN AN ORGANIZED HEALTH CARE EDUCATION/TRAINING PROGRAM

## 2024-06-13 PROCEDURE — 0DBN8ZX EXCISION OF SIGMOID COLON, VIA NATURAL OR ARTIFICIAL OPENING ENDOSCOPIC, DIAGNOSTIC: ICD-10-PCS | Performed by: STUDENT IN AN ORGANIZED HEALTH CARE EDUCATION/TRAINING PROGRAM

## 2024-06-13 PROCEDURE — 0DB78ZX EXCISION OF STOMACH, PYLORUS, VIA NATURAL OR ARTIFICIAL OPENING ENDOSCOPIC, DIAGNOSTIC: ICD-10-PCS | Performed by: STUDENT IN AN ORGANIZED HEALTH CARE EDUCATION/TRAINING PROGRAM

## 2024-06-13 PROCEDURE — 0DB68ZX EXCISION OF STOMACH, VIA NATURAL OR ARTIFICIAL OPENING ENDOSCOPIC, DIAGNOSTIC: ICD-10-PCS | Performed by: STUDENT IN AN ORGANIZED HEALTH CARE EDUCATION/TRAINING PROGRAM

## 2024-06-13 RX ORDER — LIDOCAINE HYDROCHLORIDE 10 MG/ML
INJECTION, SOLUTION EPIDURAL; INFILTRATION; INTRACAUDAL; PERINEURAL AS NEEDED
Status: DISCONTINUED | OUTPATIENT
Start: 2024-06-13 | End: 2024-06-13 | Stop reason: SURG

## 2024-06-13 RX ORDER — SODIUM CHLORIDE, SODIUM LACTATE, POTASSIUM CHLORIDE, CALCIUM CHLORIDE 600; 310; 30; 20 MG/100ML; MG/100ML; MG/100ML; MG/100ML
INJECTION, SOLUTION INTRAVENOUS CONTINUOUS
Status: DISCONTINUED | OUTPATIENT
Start: 2024-06-13 | End: 2024-06-13

## 2024-06-13 RX ORDER — NALOXONE HYDROCHLORIDE 0.4 MG/ML
80 INJECTION, SOLUTION INTRAMUSCULAR; INTRAVENOUS; SUBCUTANEOUS AS NEEDED
Status: DISCONTINUED | OUTPATIENT
Start: 2024-06-13 | End: 2024-06-13

## 2024-06-13 RX ADMIN — LIDOCAINE HYDROCHLORIDE 50 MG: 10 INJECTION, SOLUTION EPIDURAL; INFILTRATION; INTRACAUDAL; PERINEURAL at 09:49:00

## 2024-06-13 NOTE — OPERATIVE REPORT
EGD operative report  Patient Name: Afua Bryant  Date: 6/13/2024  Procedure: Esophagogastroduodenoscopy with biopsy and snare polypectomy  Pre-Op Diagnosis: reflux  Post-Op Diagnosis: gastric polyps  Attending: Bernardino De Leon M.D.  Consent:  The risks, benefits, and alternatives were discussed with the patient / POA.  Risks included, but were not limited to, bleeding, perforation, medication effects, cardiac arrhythmias, and aspiration.  After all questions were answered to their satisfaction, a signed, informed, and witnessed consent was obtained.  Sedation: Monitored Anesthesia Care  Monitoring:  Pulsoximetry, pulse, respirations, and blood pressure were monitored throughout the entire procedure  Procedure: After achieving adequate sedation and placing the patient in the left lateral decubitus position, the lubricated upper endoscope was introduced into the mouth and advanced to the descending duodenum.  The endoscope was then withdrawn into the gastric antrum and placed in a retroflexed position.  The endoscope was then righted, and air was suctioned from the stomach.  The endoscope was then withdrawn from the patient, with careful visual inspection of the mucosa revealing no additional pathologic findings.  The patient tolerated the procedure without apparent procedural complications.  The patient left the procedure room in stable condition for recovery.  Findings:   Esophagus: The mucosa was normal.    GE Junction: The Z line is regular.  There is a 2-3 cm sliding hiatal hernia.    Stomach:  There are numerous gastric polyps in the body and fundus, ranging from 3 mm to 10 mm.  The 5 largest were removed with a cold snare polypectomy, and retrieved using a Lynn net.     Duodenum: The duodenal bulb, post-bulbar duodenum, and descending duodenum were normal.    Impression: Findings as above  Recommendations:   Continue PPI therapy twice daily, 30 min prior to breakfast and dinner  Minimize EtOH  Weight loss  through diet and exercise  Repeat EGD not indicated    Bernardino De Leon MD

## 2024-06-13 NOTE — OPERATIVE REPORT
Colon operative report  Patient Name: Afua Bryant  Date or Service: 6/13/2024  Procedure: Colonoscopy with snare polypectomy  Pre-Op Diagnosis:J personal history of colon polyps, family history of colon cancer  Post-Op Diagnosis: sigmoid colon polyp, diverticulosis  Attending: Bernardino De Leon M.D.  Consent: The risks, benefits, and alternatives were discussed with the patient / POA.  Risks included, but were not limited to, bleeding, perforation, medication effects, cardiac arrhythmias, missed polyps, and aspiration.  After all questions were answered to their satisfaction, a signed, informed, and witnessed consent was obtained.  Sedation: Monitored Anesthesia Care  Monitoring: Pulsoximetry, pulse, respirations, and blood pressure were monitored throughout the entire procedure    Preparation Quality: fair.  [place] Bowel Prep Score: Right 2 / Transverse 3 / Left 2   Procedure: After achieving adequate sedation, and placing the patient in the left lateral decubitus position, a digital rectal examination was performed.  The lubricated tip of the pediatric colonoscope was then introduced into the rectum and advanced to the terminal ileum.  The appendiceal orifice and ileocecal valve were clearly and distinctly visualized, thus verifying the cecum.  The terminal ileum was intubated and found to be normal to the extent examined.  The endoscope was then carefully withdrawn from the patient with careful visualization of the colonic mucosa revealing no additional pathologic findings.  Air was suctioned to the best of my ability, during withdrawal of the endoscope.  When the endoscope reached the rectum, it was placed in a retroflexed position, and the rectal bulb was thus visualized.  The endoscope was righted, and then removed from the patient.  The patient tolerated the procedure without apparent procedural complications.  The patient left the procedure room in stable condition for recovery.  Findings:    Normal  terminal ileum  6 mm polyp in the sigmoid colon, resected with a cold snare and cold forceps, histology pending  Diverticulosis throughout the sigmoid colon  Moderate sized internal hemorrhoids  Impression: Findings as above.    Recommendations:   Await pathology  No indication for repeat colonoscopy    Bernardino De Leon MD

## 2024-06-13 NOTE — INTERVAL H&P NOTE
Pre-op Diagnosis: Loose stools [R19.5]  Change in bowel habits [R19.4]  Diverticulitis [K57.92]  Family history of colon cancer [Z80.0]    The above referenced H&P was reviewed by Bernardino De Leon MD on 6/13/2024, the patient was examined and no significant changes have occurred in the patient's condition since the H&P was performed.  I discussed with the patient and/or legal representative the potential benefits, risks and side effects of this procedure; the likelihood of the patient achieving goals; and potential problems that might occur during recuperation.  I discussed reasonable alternatives to the procedure, including risks, benefits and side effects related to the alternatives and risks related to not receiving this procedure.  We will proceed with procedure as planned.

## 2024-06-13 NOTE — DISCHARGE INSTRUCTIONS
Per Dr De Leon  There is a small hiatal hernia, which explains the chronic reflux and need for additional 2nd dose of reflux meds at night  Continue reflux meds twice daily  Gastric polyps were identified and removed  The colon is healthy and there was only 1 polyp identified and removed  You have diverticulosis and should eat a high fiber diet or use daily fiber supplements  There are moderate sized hemorrhoids  Minimize wine and alcohol    Home Care Instructions for Colonoscopy and/or Gastroscopy with Sedation    Diet:  - Start with light meals to minimize bloating.  - Do not drink alcohol today.    Medication:  - If you have questions about resuming your normal medications, please contact your Primary Care Physician.    Activities:  - Take it easy today. Do not return to work today.  - Do not drive today.  - Do not operate any machinery today (including kitchen equipment).    Colonoscopy:  - You may notice some rectal \"spotting\" (a little blood on the toilet tissue) for a day or two after the exam. This is normal.  - If you experience any rectal bleeding (not spotting), persistent tenderness or sharp severe abdominal pains, oral temperature over 100 degrees Fahrenheit, light-headedness or dizziness, or any other problems, contact your doctor.    Gastroscopy:  - You may have a sore throat for 2-3 days following the exam. This is normal. Gargling with warm salt water (1/2 tsp salt to 1 glass warm water) or using throat lozenges will help.  - If you experience any sharp pain in your neck, abdomen or chest, vomiting of blood, oral temperature over 100 degrees Fahrenheit, light-headedness or dizziness, or any other problems, contact your doctor.    **If unable to reach your doctor, please go to the [facility] Emergency Room**    - Your referring physician will receive a full report of your examination.  - If you do not hear from your doctor's office within two weeks of your biopsy, please call them for your  results.

## 2024-06-13 NOTE — ANESTHESIA POSTPROCEDURE EVALUATION
[facility]    Afua Bryant Patient Status:  Hospital Outpatient Surgery   Age/Gender 79 year old female MRN [id number]   Location [facility] ENDOSCOPY PAIN CENTER Attending Bernardino De Leon MD   Hosp Day # 0 PCP Baby Than       Anesthesia Post-op Note    ESOPHAGOGASTRODUODENOSCOPY (EGD) with cold snare polypectomy, COLONOSCOPY with cold snare polypectomy    Procedure Summary       Date: 06/13/24 Room / Location:  ENDOSCOPY 02 / [facility] ENDOSCOPY    Anesthesia Start: 0945 Anesthesia Stop: 1026    Procedures:       ESOPHAGOGASTRODUODENOSCOPY (EGD) with cold snare polypectomy, COLONOSCOPY with cold snare polypectomy      COLONOSCOPY Diagnosis:       Loose stools      Change in bowel habits      Diverticulitis      Family history of colon cancer      (GASTRIC POLYPS, hiatal hernia, polyps, diverticulosis)    Surgeons: Bernardino De Leon MD Anesthesiologist: Aurelio Randle MD    Anesthesia Type: MAC ASA Status: 3            Anesthesia Type: MAC    Vitals Value Taken Time   /60 06/13/24 1026   Temp  06/13/24 1026   Pulse 68 06/13/24 1026   Resp 16 06/13/24 1026   SpO2 97 06/13/24 1026       Patient Location: Endoscopy    Anesthesia Type: MAC    Airway Patency: patent    Postop Pain Control: adequate    Mental Status: mildly sedated but able to meaningfully participate in the post-anesthesia evaluation    Nausea/Vomiting: none    Cardiopulmonary/Hydration status: stable euvolemic    Complications: no apparent anesthesia related complications    Postop vital signs: stable    Dental Exam: Unchanged from Preop    Patient to be discharged from PACU when criteria met.

## 2024-06-13 NOTE — ANESTHESIA PREPROCEDURE EVALUATION
PRE-OP EVALUATION    Patient Name: Afua Bryant    Admit Diagnosis: Loose stools [R19.5]  Change in bowel habits [R19.4]  Diverticulitis [K57.92]  Family history of colon cancer [Z80.0]    Pre-op Diagnosis: Loose stools [R19.5]  Change in bowel habits [R19.4]  Diverticulitis [K57.92]  Family history of colon cancer [Z80.0]    ESOPHAGOGASTRODUODENOSCOPY (EGD), COLONOSCOPY    Anesthesia Procedure: ESOPHAGOGASTRODUODENOSCOPY (EGD), COLONOSCOPY  COLONOSCOPY    Surgeons and Role:     * Bernardino De Leon MD - Primary    Pre-op vitals reviewed.        Body mass index is 34.76 kg/m².    Current medications reviewed.  Hospital Medications:   lactated ringers infusion   Intravenous Continuous       Outpatient Medications:     Medications Prior to Admission   Medication Sig Dispense Refill Last Dose    vitamin B-12 50 MCG Oral Tab Take 1 tablet (50 mcg total) by mouth daily.       aspirin 81 MG Oral Tab EC Take 1 tablet (81 mg total) by mouth daily.       metRONIDAZOLE 500 MG Oral Tab Take 1 tablet (500 mg total) by mouth 3 (three) times daily.       moxifloxacin 0.5 % Ophthalmic Solution Apply 1 drop to eye in the morning, at noon, in the evening, and at bedtime.       prednisoLONE 1 % Ophthalmic Suspension Apply 1 drop to eye 4 (four) times daily.       escitalopram 5 MG Oral Tab Take 1 tablet (5 mg total) by mouth daily.       atorvastatin 10 MG Oral Tab Take 1 tablet (10 mg total) by mouth nightly.       Metoprolol Succinate ER 25 MG Oral Tablet 24 Hr Take 1 tablet (25 mg total) by mouth daily. 1.5 tabs daily       Esomeprazole Magnesium 20 MG Oral Capsule Delayed Release Take 1 capsule (20 mg total) by mouth 2 (two) times daily before meals.       Multiple Vitamins-Minerals (MULTI-VITAMIN GUMMIES) Oral Chew Tab Chew by mouth daily.         PEG 3350-KCl-Na Bicarb-NaCl 420 g Oral Recon Soln Take as directed by your doctor 4000 mL 0        Allergies: Fruit & vegetable, Gramineae pollens, Apple, Celery oil, Nutritional  supplements, Other, and Seasonal      Anesthesia Evaluation    Patient summary reviewed.    Anesthetic Complications  (+) history of anesthetic complications  History of: difficult airway and PONV       GI/Hepatic/Renal      (+) GERD          (+) liver disease                 Cardiovascular                (+) obesity  (+) hypertension             (+) valvular problems/murmurs and MVP, MR and TR    (+) dysrhythmias and PVC                  Endo/Other                                  Pulmonary                           Neuro/Psych      (+) depression                        Etoh hx        Past Surgical History:   Procedure Laterality Date    Appendectomy  1967    Arthroscopy of joint unlisted      right knee    Colonoscopy N/A 08/12/2020    Procedure: COLONOSCOPY;  Surgeon: Bernardino De Leon MD;  Location:  ENDOSCOPY    Dilation/curettage,diagnostic  1976    Ercp,diagnostic      Tonsillectomy      Upper gi endoscopy,exam      with dilation x 5 times     Social History     Socioeconomic History    Marital status:    Tobacco Use    Smoking status: Never    Smokeless tobacco: Never   Vaping Use    Vaping status: Never Used   Substance and Sexual Activity    Alcohol use: Yes     Alcohol/week: 10.0 - 12.0 standard drinks of alcohol     Types: 10 - 12 Glasses of wine per week     Comment: weekly    Drug use: Never     History   Drug Use Unknown     Available pre-op labs reviewed.               Airway      Mallampati: II  Mouth opening: 3 FB  TM distance: 4 - 6 cm  Neck ROM: full Cardiovascular      Rhythm: regular  Rate: normal     Dental  Comment: No loose teeth reported by patient           Pulmonary      Breath sounds clear to auscultation bilaterally.               Other findings              ASA: 3   Plan: MAC  NPO status verified and patient meets guidelines.          Plan/risks discussed with: patient                Present on Admission:  **None**

## 2024-06-14 NOTE — PROGRESS NOTES
Afua,    No worrisome findings on the biopsies.  Follow-up in 6 months.  Please call with any questions,    Bernardino De Leon MD

## 2024-10-03 NOTE — OPERATIVE REPORT
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 Consent:  The risks, benefits, and alternatives were discussed with the patient / POA. Risks included, but were not limited to, bleeding, perforation, medication effects, cardiac arrhythmias, and aspiration. After all questions were answered to their satisfaction, a signed, informed, and witnessed consent was obtained. Sedation: Monitored Anesthesia Care  Monitoring:  Pulsoximetry, pulse, respirations, and blood pressure were monitored throughout the entire procedure  Procedure: After achieving adequate sedation and placing the patient in the left lateral decubitus position, the lubricated upper endoscope was introduced into the mouth and advanced to the descending duodenum. The endoscope was then withdrawn into the gastric antrum and placed in a retroflexed position. The endoscope was then righted, and air was suctioned from the stomach. The endoscope was then withdrawn from the patient, with careful visual inspection of the mucosa revealing no additional pathologic findings. The patient tolerated the procedure without apparent procedural complications. The patient left the procedure room in stable condition for recovery. Findings:    Esophagus: The mucosa of the esophageus was normal.  Random biopsies were obtained from the distal and proximal esophagus. GE Junction: Small amount of erythema noted circumferentially along the GE junction / Z-line, consistent with reflux esophagitis (mild). There was no evidence of Carrera's esophagus. Stomach: There is patchy erythema in the gastric antrum. The gastric body, fundus, cardia, and angularis were normal.  Biopsies were obtained from the antrum, body, and fundus, to evaluate for H.pylori. Duodenum:  The The patient is Stable - Low risk of patient condition declining or worsening    Shift Goals  Clinical Goals: pain and nausea management  Patient Goals: pain and nausea management  Family Goals: iam    Progress made toward(s) clinical / shift goals:  Pain and nausea managed with prn medications per MAR.      Problem: Pain - Standard  Goal: Alleviation of pain or a reduction in pain to the patient’s comfort goal  Outcome: Progressing     Problem: Knowledge Deficit - Standard  Goal: Patient and family/care givers will demonstrate understanding of plan of care, disease process/condition, diagnostic tests and medications  Outcome: Progressing          duodenal bulb, post-bulbar duodenum, and descending duodenum were normal.  Impression: Findings as above. Recommendations:   1. Await pathology. 2. Continue reflux medications twice daily 30 min prior to breakfast and dinner (same dose and frequency as prior). 3. Repeat EGD in 5 years.     Telma Nye MD

## (undated) DEVICE — Device: Brand: DEFENDO AIR/WATER/SUCTION AND BIOPSY VALVE

## (undated) DEVICE — BITEBLOCK ENDOSCP 60FR MAXI STRP

## (undated) DEVICE — KIT VLV 5 PC AIR H2O SUCT BX ENDOGATOR CONN

## (undated) DEVICE — MEDI-VAC NON-CONDUCTIVE SUCTION TUBING: Brand: CARDINAL HEALTH

## (undated) DEVICE — 10FT COMBINED O2 DELIVERY/CO2 MONITORING. FILTER WITH MICROSTREAM TYPE LUER: Brand: DUAL ADULT NASAL CANNULA

## (undated) DEVICE — MEDI-VAC SUCTION HANDLE REGULAR CAPACITY: Brand: CARDINAL HEALTH

## (undated) DEVICE — FORCEP BIOPSY RJ4 LG CAP W/ND

## (undated) DEVICE — ENDOSCOPY PACK - LOWER: Brand: MEDLINE INDUSTRIES, INC.

## (undated) DEVICE — KIT CUSTOM ENDOPROCEDURE STERIS

## (undated) DEVICE — NET SPEC 3X6CM TIP DIA2.5MM CATH L230CM

## (undated) DEVICE — 1200CC GUARDIAN II: Brand: GUARDIAN

## (undated) DEVICE — FILTERLINE NASAL ADULT O2/CO2

## (undated) DEVICE — ENDOSCOPY PACK UPPER: Brand: MEDLINE INDUSTRIES, INC.

## (undated) DEVICE — 3M™ RED DOT™ MONITORING ELECTRODE WITH FOAM TAPE AND STICKY GEL, 50/BAG, 20/CASE, 72/PLT 2570: Brand: RED DOT™

## (undated) DEVICE — LASSO POLYPECTOMY SNARE: Brand: LASSO